# Patient Record
Sex: MALE | Race: OTHER | Employment: OTHER | URBAN - METROPOLITAN AREA
[De-identification: names, ages, dates, MRNs, and addresses within clinical notes are randomized per-mention and may not be internally consistent; named-entity substitution may affect disease eponyms.]

---

## 2021-02-23 ENCOUNTER — NEW PATIENT (OUTPATIENT)
Dept: URBAN - METROPOLITAN AREA CLINIC 11 | Facility: CLINIC | Age: 56
End: 2021-02-23

## 2021-02-23 ASSESSMENT — VISUAL ACUITY
OD_PH: 20/100
OD_CC: 20/100-1

## 2021-02-23 ASSESSMENT — TONOMETRY
OS_IOP_MMHG: 17
OD_IOP_MMHG: 11

## 2021-02-23 NOTE — PATIENT DISCUSSION
Discussed the condition of the left eye with patient.  I discussed that  there is poor potential for useful vision in his left eye.  I discussed with patient that after all the possibility of another retinal surgery and then secondary lens implant surgery, that his return for useful vision is very limited.  Patient desires to try and see if this would help him at all.  I discussed case with him and will plan PPV, MP, Remove vinay oil, laser, AC washout, replace silicone oil in the left eye after he is done with cataract  surgery in the right eye.  Patient understands procedure, condition, risks and limited benefits and desires to proceed.

## 2021-02-23 NOTE — PATIENT DISCUSSION
Discussed with patient that I do not feel he is at any increased risk for retinal detachment in his right eye if his cataract is removed.  He is cleared for cataract surgery in the right eye and will be seeing Dr. Javon Willis for evaluation and Cataract surgery. Patient was given Dr. Arguelles Plenty contact information to call and schedule an appointment for Cataract evaluation and surgery.  Mr. Lia Prasad contact information is [phone], just in case you need it.  Thank you.

## 2021-05-18 ENCOUNTER — POST-OP CHECK (OUTPATIENT)
Dept: URBAN - METROPOLITAN AREA CLINIC 11 | Facility: CLINIC | Age: 56
End: 2021-05-18

## 2021-05-18 DIAGNOSIS — H33.42: ICD-10-CM

## 2021-05-18 PROCEDURE — 99024 POSTOP FOLLOW-UP VISIT: CPT

## 2021-05-18 ASSESSMENT — TONOMETRY: OS_IOP_MMHG: 12

## 2021-05-18 ASSESSMENT — VISUAL ACUITY: OD_SC: 20/30-1

## 2021-07-20 ENCOUNTER — FOLLOW UP (OUTPATIENT)
Dept: URBAN - METROPOLITAN AREA CLINIC 11 | Facility: CLINIC | Age: 56
End: 2021-07-20

## 2021-07-20 DIAGNOSIS — H35.431: ICD-10-CM

## 2021-07-20 DIAGNOSIS — H33.42: ICD-10-CM

## 2021-07-20 DIAGNOSIS — H35.371: ICD-10-CM

## 2021-07-20 PROCEDURE — 92201 OPSCPY EXTND RTA DRAW UNI/BI: CPT

## 2021-07-20 PROCEDURE — 99214 OFFICE O/P EST MOD 30 MIN: CPT

## 2021-07-20 ASSESSMENT — TONOMETRY
OS_IOP_MMHG: 10
OD_IOP_MMHG: 11

## 2021-07-20 ASSESSMENT — VISUAL ACUITY
OD_SC: 20/20-1
OS_SC: CF 2FT

## 2021-07-20 NOTE — PATIENT DISCUSSION
28 minutes  was spent in dialogue with patient.  I discussed with patient that I would like to wait another 3 months before scheduling Silcone Oil removal.

## 2021-10-21 ENCOUNTER — FOLLOW UP (OUTPATIENT)
Dept: URBAN - METROPOLITAN AREA CLINIC 11 | Facility: CLINIC | Age: 56
End: 2021-10-21

## 2021-10-21 DIAGNOSIS — H33.42: ICD-10-CM

## 2021-10-21 DIAGNOSIS — H35.431: ICD-10-CM

## 2021-10-21 DIAGNOSIS — H35.371: ICD-10-CM

## 2021-10-21 PROCEDURE — 92201 OPSCPY EXTND RTA DRAW UNI/BI: CPT

## 2021-10-21 PROCEDURE — 99214 OFFICE O/P EST MOD 30 MIN: CPT

## 2021-10-21 ASSESSMENT — TONOMETRY
OD_IOP_MMHG: 11
OS_IOP_MMHG: 24

## 2021-10-21 ASSESSMENT — VISUAL ACUITY
OD_SC: 20/20-1
OS_SC: CF 2FT

## 2021-10-21 NOTE — PATIENT DISCUSSION
28 minutes  was spent in dialogue with patient. I have recommended that we schedule a time to remove the silicone oil from the left eye. All questions and concerns addressed and he would like to proceed with scheduling.

## 2021-11-15 ENCOUNTER — SURGERY/PROCEDURE (OUTPATIENT)
Dept: URBAN - METROPOLITAN AREA EYE CENTER 1 | Facility: EYE CENTER | Age: 56
End: 2021-11-15

## 2021-11-15 DIAGNOSIS — H33.42: ICD-10-CM

## 2021-11-15 PROCEDURE — 67036 REMOVAL OF INNER EYE FLUID: CPT

## 2021-11-15 NOTE — PATIENT DISCUSSION
1 day post-op instructions given. All questions answered. I will see him in 1 week. He is to call if he has any problems or concerns.

## 2021-11-16 ENCOUNTER — POST-OP CHECK (OUTPATIENT)
Dept: URBAN - METROPOLITAN AREA CLINIC 11 | Facility: CLINIC | Age: 56
End: 2021-11-16

## 2021-11-16 DIAGNOSIS — H33.42: ICD-10-CM

## 2021-11-16 PROCEDURE — 99024 POSTOP FOLLOW-UP VISIT: CPT

## 2021-11-16 ASSESSMENT — TONOMETRY: OS_IOP_MMHG: 6

## 2021-11-16 ASSESSMENT — VISUAL ACUITY: OD_SC: 20/25

## 2021-11-23 ENCOUNTER — POST-OP CHECK (OUTPATIENT)
Dept: URBAN - METROPOLITAN AREA CLINIC 11 | Facility: CLINIC | Age: 56
End: 2021-11-23

## 2021-11-23 DIAGNOSIS — H33.42: ICD-10-CM

## 2021-11-23 PROCEDURE — 99024 POSTOP FOLLOW-UP VISIT: CPT

## 2021-11-23 ASSESSMENT — VISUAL ACUITY: OD_SC: 20/25

## 2021-11-23 NOTE — PATIENT DISCUSSION
1 week post-op instructions given. All questions answered. I will see him in 1 month. He is to call if he has any problems or concerns. I have asked him to begin using the Prednisolone 4 times a day.

## 2021-12-21 ENCOUNTER — POST-OP CHECK (OUTPATIENT)
Dept: URBAN - METROPOLITAN AREA CLINIC 11 | Facility: CLINIC | Age: 56
End: 2021-12-21

## 2021-12-21 DIAGNOSIS — H33.42: ICD-10-CM

## 2021-12-21 PROCEDURE — 99024 POSTOP FOLLOW-UP VISIT: CPT

## 2021-12-21 ASSESSMENT — TONOMETRY: OS_IOP_MMHG: 08

## 2021-12-21 ASSESSMENT — VISUAL ACUITY: OD_SC: 20/25

## 2021-12-21 NOTE — PATIENT DISCUSSION
There is recurrent retinal detachment seen today on exam. I have discussed my exam findings today with Mr. Kellogg Pallavi and all his questions were addressed. I have recommended doing PPV,endolaser and Gas in the left eye to repair a recurrent Retinal Detachment.

## 2022-01-19 ENCOUNTER — SURGERY/PROCEDURE (OUTPATIENT)
Dept: URBAN - METROPOLITAN AREA EYE CENTER 1 | Facility: EYE CENTER | Age: 57
End: 2022-01-19

## 2022-01-19 DIAGNOSIS — H33.42: ICD-10-CM

## 2022-01-19 PROCEDURE — 67113 REPAIR RETINAL DETACH CPLX: CPT

## 2022-01-20 ENCOUNTER — POST-OP (OUTPATIENT)
Dept: URBAN - METROPOLITAN AREA CLINIC 11 | Facility: CLINIC | Age: 57
End: 2022-01-20

## 2022-01-20 DIAGNOSIS — H33.42: ICD-10-CM

## 2022-01-20 PROCEDURE — 99024 POSTOP FOLLOW-UP VISIT: CPT

## 2022-01-20 ASSESSMENT — TONOMETRY: OS_IOP_MMHG: 13

## 2022-01-27 ENCOUNTER — POST-OP (OUTPATIENT)
Dept: URBAN - METROPOLITAN AREA CLINIC 11 | Facility: CLINIC | Age: 57
End: 2022-01-27

## 2022-01-27 DIAGNOSIS — H33.42: ICD-10-CM

## 2022-01-27 PROCEDURE — 99024 POSTOP FOLLOW-UP VISIT: CPT

## 2022-01-27 RX ORDER — DORZOLAMIDE HYDROCHLORIDE AND TIMOLOL MALEATE 20; 5 MG/ML; MG/ML
1 SOLUTION/ DROPS OPHTHALMIC TWICE A DAY
Start: 2022-01-27

## 2022-01-27 ASSESSMENT — VISUAL ACUITY: OD_SC: 20/25

## 2022-01-27 NOTE — PATIENT DISCUSSION
Good one week post op appearance. His pressure is up a little today and I have elected to start him on Cosopt, BID OS (insurance would not cover Combigan).

## 2022-03-01 ENCOUNTER — POST-OP (OUTPATIENT)
Dept: URBAN - METROPOLITAN AREA CLINIC 11 | Facility: CLINIC | Age: 57
End: 2022-03-01

## 2022-03-01 DIAGNOSIS — H33.42: ICD-10-CM

## 2022-03-01 PROCEDURE — 99024 POSTOP FOLLOW-UP VISIT: CPT

## 2022-03-01 ASSESSMENT — TONOMETRY: OS_IOP_MMHG: 8

## 2022-05-03 ENCOUNTER — FOLLOW UP (OUTPATIENT)
Dept: URBAN - METROPOLITAN AREA CLINIC 11 | Facility: CLINIC | Age: 57
End: 2022-05-03

## 2022-05-03 DIAGNOSIS — H33.42: ICD-10-CM

## 2022-05-03 DIAGNOSIS — H35.431: ICD-10-CM

## 2022-05-03 PROCEDURE — 92201 OPSCPY EXTND RTA DRAW UNI/BI: CPT

## 2022-05-03 PROCEDURE — 99213 OFFICE O/P EST LOW 20 MIN: CPT

## 2022-05-03 ASSESSMENT — TONOMETRY: OS_IOP_MMHG: 10

## 2022-05-03 NOTE — PATIENT DISCUSSION
SARAH BETH WITH DR. Nica Mello FOR LENS CONSULTATION.    PATIENT WOULD OUR STAFF TO CALL AND SET APPT FOR HIM.

## 2022-05-09 ENCOUNTER — ESTABLISHED PATIENT (OUTPATIENT)
Dept: URBAN - METROPOLITAN AREA CLINIC 20 | Facility: CLINIC | Age: 57
End: 2022-05-09

## 2022-05-09 DIAGNOSIS — H33.42: ICD-10-CM

## 2022-05-09 DIAGNOSIS — H52.11: ICD-10-CM

## 2022-05-09 DIAGNOSIS — H35.431: ICD-10-CM

## 2022-05-09 DIAGNOSIS — H35.371: ICD-10-CM

## 2022-05-09 PROCEDURE — 92014 COMPRE OPH EXAM EST PT 1/>: CPT

## 2022-05-09 PROCEDURE — 92015 DETERMINE REFRACTIVE STATE: CPT

## 2022-05-09 ASSESSMENT — VISUAL ACUITY: OU_SC: J10

## 2022-05-09 ASSESSMENT — KERATOMETRY
OD_K2POWER_DIOPTERS: 39.00
OD_AXISANGLE_DEGREES: 9
OS_K1POWER_DIOPTERS: 38.00
OS_AXISANGLE_DEGREES: 5
OS_AXISANGLE2_DEGREES: 95
OS_K2POWER_DIOPTERS: 39.25
OD_K1POWER_DIOPTERS: 38.75
OD_AXISANGLE2_DEGREES: 99

## 2022-05-09 ASSESSMENT — TONOMETRY
OS_IOP_MMHG: 3
OD_IOP_MMHG: 10

## 2022-05-09 NOTE — PATIENT DISCUSSION
SARAH BETH WITH DR. Shira Britt FOR LENS CONSULTATION.    PATIENT WOULD OUR STAFF TO CALL AND SET APPT FOR HIM.

## 2022-05-23 ENCOUNTER — ESTABLISHED PATIENT (OUTPATIENT)
Dept: URBAN - METROPOLITAN AREA CLINIC 20 | Facility: CLINIC | Age: 57
End: 2022-05-23

## 2022-05-23 DIAGNOSIS — H33.42: ICD-10-CM

## 2022-05-23 PROCEDURE — 92083 EXTENDED VISUAL FIELD XM: CPT

## 2022-05-23 PROCEDURE — 99211T TECH SERVICE

## 2022-05-23 NOTE — PATIENT DISCUSSION
FU WITH DR. Kayla Davis FOR LENS CONSULTATION.    PATIENT WOULD OUR STAFF TO CALL AND SET APPT FOR HIM.

## 2022-05-25 ASSESSMENT — KERATOMETRY
OS_AXISANGLE_DEGREES: 5
OD_K2POWER_DIOPTERS: 39.00
OD_AXISANGLE2_DEGREES: 99
OS_K1POWER_DIOPTERS: 38.00
OS_K2POWER_DIOPTERS: 39.25
OD_AXISANGLE_DEGREES: 9
OD_K1POWER_DIOPTERS: 38.75
OS_AXISANGLE2_DEGREES: 95